# Patient Record
Sex: MALE | Race: WHITE | Employment: FULL TIME | URBAN - METROPOLITAN AREA
[De-identification: names, ages, dates, MRNs, and addresses within clinical notes are randomized per-mention and may not be internally consistent; named-entity substitution may affect disease eponyms.]

---

## 2020-03-24 ENCOUNTER — OFFICE VISIT (OUTPATIENT)
Dept: SURGERY | Age: 25
End: 2020-03-24

## 2020-03-24 VITALS
OXYGEN SATURATION: 99 % | HEART RATE: 67 BPM | SYSTOLIC BLOOD PRESSURE: 117 MMHG | WEIGHT: 144 LBS | RESPIRATION RATE: 16 BRPM | DIASTOLIC BLOOD PRESSURE: 86 MMHG | TEMPERATURE: 98.5 F

## 2020-03-24 DIAGNOSIS — R10.31 RIGHT GROIN PAIN: Primary | ICD-10-CM

## 2020-03-24 NOTE — PROGRESS NOTES
1. Have you been to the ER, urgent care clinic since your last visit? Hospitalized since your last visit? No    2. Have you seen or consulted any other health care providers outside of the [address] [place] [address] since your last visit? Include any pap smears or colon screening.  No

## 2020-03-24 NOTE — PROGRESS NOTES
Zelda Kaiser is a 30 y.o. male who is referred by Patient Pop Wilkinson, for further evaluation of right groin pain and possible right inguinal hernia. Mr. Sarah Agudelo tells me that he recently began experiencing pain in his lower back as well as abdominal cramps. Furthermore, Mr. Sarah Agudelo has noted pain in his right groin that is worse with lifting or after being on his feet for a long time. He has not noted a discrete bulge in his right groin. No left groin pain or associated left groin bulge. In addition, Mr. Sarah Agudelo has had a subcutaneous mass on his right buttock for some now. No significant change in the size of the mass and no associated discomfort. He has otherwise been in his usual state of health. Past Medical History:   Diagnosis Date    Right groin pain 3/24/2020     History reviewed. No pertinent surgical history. History reviewed. No pertinent family history. Social History     Socioeconomic History    Marital status:      Spouse name: Not on file    Number of children: Not on file    Years of education: Not on file    Highest education level: Not on file     Review of systems negative except as noted. Review of Systems   Respiratory: Negative for cough. Gastrointestinal: Positive for abdominal pain (Right groin. ). Negative for constipation, nausea and vomiting. Musculoskeletal: Positive for back pain. Psychiatric/Behavioral: The patient is nervous/anxious. Physical Exam  Vitals signs reviewed. Constitutional:       General: He is not in acute distress. Appearance: Normal appearance. HENT:      Head: Normocephalic and atraumatic. Eyes:      General: No scleral icterus. Neck:      Musculoskeletal: Neck supple. Cardiovascular:      Rate and Rhythm: Normal rate and regular rhythm. Pulmonary:      Effort: Pulmonary effort is normal.      Breath sounds: Normal breath sounds. Abdominal:      General: There is no distension. Palpations: Abdomen is soft. Tenderness: There is no abdominal tenderness. There is no guarding or rebound. Hernia: No hernia is present. There is no hernia in the right inguinal area or left inguinal area. Comments: No apparent direct or indirect inguinal hernia bilaterally. Musculoskeletal: Normal range of motion. Lymphadenopathy:      Cervical: No cervical adenopathy. Skin:            Comments: Possible intramuscular lipoma. Neurological:      General: No focal deficit present. Mental Status: He is alert. ASSESSMENT and PLAN  Explained to Mr. Michele Talbert that it is unclear whether or not he has a right inguinal hernia. Will check a scrotal ultrasound and will see him following that to review the findings with him. If he is found to have a hernia then Mr. Michele Talbert should benefit from repair since the hernia is symptomatic. If no hernia on ultrasound then will check a CT scan of the abdomen/pelvis to better evaluate the right groin as well as the possible right buttock lipoma. He is agreeable to this plan and is most certainly free to contact the office should any questions or concerns arise.      CC: Patient Sonja Nichols

## 2020-03-25 ENCOUNTER — TELEPHONE (OUTPATIENT)
Dept: SURGERY | Age: 25
End: 2020-03-25

## 2020-03-25 NOTE — TELEPHONE ENCOUNTER
gabrielle returned call states patient is unable to wait due to pain. She will proceed with scheduling for an earlier date.

## 2020-03-25 NOTE — TELEPHONE ENCOUNTER
Dulce with Coordination of care called stating that she needs to know if the patient's ultrasound is urgent or can wait until after May 26th.

## 2020-03-25 NOTE — TELEPHONE ENCOUNTER
Spoke with Yolanda Pollard with coordination of care team informed her per Dr. Elizabeth Hernandez scan can wait til May unless the patient starts to have increased pain he can call us to get scheduled sooner.

## 2020-03-26 ENCOUNTER — HOSPITAL ENCOUNTER (OUTPATIENT)
Dept: ULTRASOUND IMAGING | Age: 25
Discharge: HOME | End: 2020-03-26
Attending: SURGERY
Payer: COMMERCIAL

## 2020-03-26 DIAGNOSIS — R10.31 RIGHT GROIN PAIN: ICD-10-CM

## 2020-03-26 PROCEDURE — 76870 US EXAM SCROTUM: CPT

## 2020-03-31 ENCOUNTER — HOSPITAL ENCOUNTER (OUTPATIENT)
Dept: GENERAL RADIOLOGY | Age: 25
Discharge: HOME | End: 2020-03-31
Payer: COMMERCIAL

## 2020-03-31 ENCOUNTER — OFFICE VISIT (OUTPATIENT)
Dept: SURGERY | Age: 25
End: 2020-03-31

## 2020-03-31 VITALS
BODY MASS INDEX: 20.16 KG/M2 | OXYGEN SATURATION: 98 % | WEIGHT: 144 LBS | HEIGHT: 71 IN | RESPIRATION RATE: 16 BRPM | DIASTOLIC BLOOD PRESSURE: 76 MMHG | TEMPERATURE: 98.2 F | HEART RATE: 82 BPM | SYSTOLIC BLOOD PRESSURE: 134 MMHG

## 2020-03-31 DIAGNOSIS — R10.31 RIGHT GROIN PAIN: Primary | ICD-10-CM

## 2020-03-31 DIAGNOSIS — R10.31 RIGHT GROIN PAIN: ICD-10-CM

## 2020-03-31 PROCEDURE — 72072 X-RAY EXAM THORAC SPINE 3VWS: CPT

## 2020-03-31 PROCEDURE — 72100 X-RAY EXAM L-S SPINE 2/3 VWS: CPT

## 2020-03-31 NOTE — LETTER
4/20/2020 3:59 PM 
 
Patient:  Morenita Colon YOB: 1995 Date of Visit: 3/31/2020 Dear Patient Nay Bruce: Thank you for referring Mr. Morenita Colon to me for evaluation/treatment. Below are the relevant portions of my assessment and plan of care. If you have questions, please do not hesitate to call me. I look forward to following Mr. Jimy Prince along with you. Mr. Jimy Prince returns following scrotal ultrasound. He was last seen on March 24, 2020 for evaluation of right groin pain and possible right inguinal hernia. Doing well since then - back pain and abdominal cramping have improved. Still experiencing pain in his right groin and right leg. He has otherwise been in his usual state of health. Scrotal ultrasound - 3/26/2020 - Normal testes. Bilateral varicoceles, probably with a thrombosed 
component on the right. No evidence for inguinal hernia or mass lesion. Discussed ultrasound findings with Mr. Jimy Prince today and reassured him that he does not have a right inguinal hernia. In view of his back pain will check Thoracic and Lumbar spine x-rays and will see him following that to review findings with him. Activity as tolerated. Anti-inflammatories and muscle relaxants as needed. He is agreeable to this plan and is most certainly free to contact the office should any questions or concerns arise. CC: Patient Nay Teo Sincerely, Lino Merlos MD

## 2020-03-31 NOTE — PROGRESS NOTES
Mr. Rom Garza returns following scrotal ultrasound. He was last seen on March 24, 2020 for evaluation of right groin pain and possible right inguinal hernia. Doing well since then - back pain and abdominal cramping have improved. Still experiencing pain in his right groin and right leg. He has otherwise been in his usual state of health. Scrotal ultrasound - 3/26/2020 - Normal testes. Bilateral varicoceles, probably with a thrombosed  component on the right. No evidence for inguinal hernia or mass lesion. Discussed ultrasound findings with Mr. Rom Garza today and reassured him that he does not have a right inguinal hernia. In view of his back pain will check Thoracic and Lumbar spine x-rays and will see him following that to review findings with him. Activity as tolerated. Anti-inflammatories and muscle relaxants as needed. He is agreeable to this plan and is most certainly free to contact the office should any questions or concerns arise.        CC: Patient Jonathon Barroso

## 2020-04-27 ENCOUNTER — OFFICE VISIT (OUTPATIENT)
Dept: SURGERY | Age: 25
End: 2020-04-27

## 2020-04-27 DIAGNOSIS — R10.31 RIGHT GROIN PAIN: Primary | ICD-10-CM

## 2020-04-27 NOTE — PROGRESS NOTES
This was a telephone visit. Mr. Joycelyn Clayton was last seen on March 31, 2020 following scrotal ultrasound. He has been doing fairly well since then. The back pain and right testicular pain have improved. However, he is reports right leg \"tightness\" and associated muscle \"spasms. \"  Lumbar spine x-rays - 3/31/2020 - No acute fracture or dislocation. Thoracic spine x-rays - 3/31/2020 - No evidence of acute fracture or dislocation. Reviewed x-ray findings with Mr. Joycelyn Clayton today and reassured him that no acute abnormalities were noted. Again reassured him that he was not found to have a right inguinal hernia on scrotal ultrasound. In terms of the varicoceles, suggested that he follow up with Urology. Activity as tolerated. He may also benefit from Physical Therapy evaluation - will refer. Will see as needed. He is agreeable to this plan and is most certainly free to contact the office should any questions or concerns arise.        CC: Patient Liza Whitehead

## 2020-04-27 NOTE — PROGRESS NOTES
1. Have you been to the ER, urgent care clinic since your last visit? Hospitalized since your last visit? No    2. Have you seen or consulted any other health care providers outside of the [address] [place] [address] since your last visit? Include any pap smears or colon screening.  No

## 2021-05-11 ENCOUNTER — TELEPHONE (OUTPATIENT)
Dept: SURGERY | Age: 26
End: 2021-05-11

## 2021-05-11 NOTE — TELEPHONE ENCOUNTER
Please call Breanna from [facility] to discuss script for pt. Stated they faxed it over today and script needs to be fill and sent to them asap.      Fax: [phone]

## 2021-05-11 NOTE — TELEPHONE ENCOUNTER
Ryanne Little with [facility] called stated that they have not received the signed physical therapy Rx for patient. She  needs that soon because patient has initial appt tomorrow. She stated the script can be faxed to [phone].

## 2021-05-11 NOTE — TELEPHONE ENCOUNTER
Returned call to number provided states number not in services. Just received fax this afternoon provider out of office they will need to follow up in the morning. Order placed in inbox to sign.

## 2021-12-07 ENCOUNTER — TELEPHONE (OUTPATIENT)
Dept: SURGERY | Age: 26
End: 2021-12-07

## 2021-12-07 DIAGNOSIS — R10.31 RIGHT GROIN PAIN: Primary | ICD-10-CM

## 2021-12-07 NOTE — TELEPHONE ENCOUNTER
Spoke with PSR at [facility] informed her fax received but only 1 page came over which was the fax cover sheet. She stated she will fax over additional page for signature. Placed order for outpatient PT had Dr. Vincent Gates review and sign and faxed a copy to [facility]. Their refax still had not come over.

## 2021-12-07 NOTE — TELEPHONE ENCOUNTER
Lorene Akers from [facility] called wanting verification that Dr. Davon Salcido can sign off on the pt's new physical therapy scripts.  Lorene Akers will be re-faxing the scripts